# Patient Record
Sex: FEMALE | Race: WHITE | Employment: PART TIME | URBAN - METROPOLITAN AREA
[De-identification: names, ages, dates, MRNs, and addresses within clinical notes are randomized per-mention and may not be internally consistent; named-entity substitution may affect disease eponyms.]

---

## 2017-01-19 ENCOUNTER — HOSPITAL ENCOUNTER (OUTPATIENT)
Dept: PHYSICAL THERAPY | Age: 52
Discharge: HOME | End: 2017-01-19
Payer: COMMERCIAL

## 2017-01-19 PROCEDURE — 97110 THERAPEUTIC EXERCISES: CPT | Performed by: PHYSICAL THERAPIST

## 2017-01-19 PROCEDURE — 97161 PT EVAL LOW COMPLEX 20 MIN: CPT | Performed by: PHYSICAL THERAPIST

## 2017-01-19 NOTE — PROGRESS NOTES
[facility]  [facility], [address] [place] [address]  Phone: [phone]      Fax:  [phone]    Plan of Care/ Statement of Necessity for Physical Therapy Services    Richard Licona  1/19/2017   Vance Helms MD   Provider #                        Diagnosis: Left knee pain [M25.562]  Onset Date:See eval.     Start of Care:1/19/2017  Prior Level of Function:See eval.  Comorbidities: See eval.    The Plan of Care and following information is based on the information from the initial evaluation. Assessment/ key information: Pt is a 45 yo female with PMH of arthroscopic surgery to left knee in the 80's as well as athletic history playing field hockey, basketball and swimming. Pt notes increased medial left knee pain that has been worse since raising puppies/dogs as they are very strong and pull her forward in the past 6 months. Pt with increased pain, decreased balance, decreased ROM increased TTP, increased swelling, decreased functional strength shown in squatting B/L and unilateral, step down and in stairs. Treatment Plan may include any combination of the following:  Therapeutic exercise, Therapeutic activities, Neuromuscular re-education, Physical agent/modality, Gait/balance training, Manual therapy and Patient education  Patient / Family readiness to learn indicated by: asking questions, trying to perform skills and interest  Persons(s) to be included in education:   patient (P)  Barriers to Learning/Limitations: None    Patient Goal (s): Located in evaluation. Rehabilitation Potential: good    Short Term Goals: To be accomplished in 3 weeks  1) Pt able to perform quad set in [facility] without difficulty to improve quad activation so she is able to strengthen quad and recruit while going up and down stairs  2) Pt indep. In HEP from day 1    Long Term Goals: To be accomplished in 4-6 weeks   1) Pt indep.  In final HEP  2) Pt able to descend stairs with good eccentric control on the left that is equal to the right for improved functional quad strength  3) Pt will report she can walk her dogs for 20 ' or more without increased knee pain > 1/10  4) Pt able to perform SLS for 20 sec with EC on the left = to the right (increased stability) for improved balance/proprioception while walking dogs on uneven ground. Frequency / Duration: Patient to be seen 2 times per week for 4-6 weeks:  Patient/ Caregiver education and instruction: dagoberto Carrasco, PT DPT, OCS 1/19/2017 4:02 PM    Please direct questions or concerns to [place] [address] and Sports Medicine: Phone: [phone] Fax: [phone]. THANK YOU!

## 2017-01-19 NOTE — PROGRESS NOTES
[facility]  [address], [place], [address]  Phone: [phone]  Fax: [phone]      PT INITIAL EVALUATION NOTE - [facility] 2-15    Patient Name: Dennie Shackleton  Date:2017  : 1965  [x]  Patient  Verified  Payor: Brant Conley / Plan: Treinta Y Dmitry [id number] PPO / Product Type: PPO /    In time:230  Out time:345  Total Treatment Time (min): 70  Total Timed Codes (min): 20  1:1 Treatment Time ( only): n/a   Visit #: 1     Treatment Area: Left knee pain [M25.562]    SUBJECTIVE  Any medication changes, allergies to medications, adverse drug reactions, diagnosis change, or new procedure performed?: [] No    [x] Yes (see summary sheet for update)    Current symptoms/chief complaint: Pt [facility] [address] partially tore ACL and had arthroscopic surgery x 2. Pt reports overall it was fine after that besides aches/pains with weather. Adopted puppies and now they are 14 mo old, they are 80-90 lbs. Pt reports these dogs are pulling a lot. Pt notes her knee then started to ache more and more from this, changes her gait as they are pulling her forward. Dr. Han Landa gave her a cortisone shot 17, felt relief this Monday - less sharp pain, now more achy. Injection was medial knee. This Monday and Tuesday it felt better but then she swam and walked dogs and felt achy again. Date of onset/injury: past 6 months. Began to have sharp pain 1 month ago. Felt like a knife behind her knee when getting out of the car. Since injection she has less sharp pain and more achy pain. Aggravated by: \"can't straighten knee\"  Overworking it. \"My foot starts to hurt because I am walking funny. . When I am walking the dogs\"  \"Walking, stairs. Cynthemigdio Gomez wake me in the middle of the night. .\"    \"walking on treadmill. \"  Sitting jenna cross applesauce. Eased by: Advil. Injection.      Pt feels better sleeping on right side with pillow. Pain:   8/10 max 1/10 min 4/10 now       Location and description of symptoms: left knee, medial, feels tight under patella. Feels like a sock behind her knee when trying to sit jenna cross applesauce. Foot will hurt medially, near great toe. Diagnostic Tests: [] Lab work [x] X-rays    [] CT [] MRI     [] Other:  Results (per report of the patient): \"bone on bone on the outside\"  \"bursa is inflamed\"     PMHX: Significant for arthritis, asthma, CA - double mastectomy 6 years ago, no chemo. Social/Recreation/Work: Pt does elliptical at the [facility], swims at the [facility] - kicking or elliptical doesn't aggravate knee. 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      Prior level of function: 1 year ago was walking 6 miles for exercise. Patient goal(s): \"better range of motion\"  \"strengthen quad\"  \"I want to be functioning\"  \"I just want to be active\"      Objective:    Posture/Observations: atrophy left quadriceps, VMO, vastus lateralis and atrophy in gastroc-soleus complex  Genu valgum. Left knee is in flexion. Gait:  Pt notes feels \"tight\" while ambulating, slight decreased knee extension at HS/midstance. ROM:  Left : 130 deg flexion medial and posterior pain, pain in anterior tib. Region and thigh. Right:  150 deg. Left:  - 7 deg extension. Right.  - 2 deg. Strength:  Tests 5/5 quad but noted juddering slight on the left after testing. Glute med:  L: 4+/5, R 5/5. Functional Biomechanical Screen  SLS:Holds 20 \" easily with EO. With eyes closed: decreased stability on the left, increased medial/lateral motion at ankle as compared to right but able to hold 10-20 sec. Single Limb Squat:Shows femur ADD/IR/decreased stability and notes pain left posterior/medial knee.     Bilateral Squat:knees anterior to toes and increased weight shift to the right.    Step Down Test:L: shows significant femur ADD/IR and decreased stability and difficulty as compared to right. Bird dog:  With left leg ext / right arm flexion pt with decreased trunk stability in transverse plane as compared to opposite side. Stairs: significant decreased eccentric control left while descending stairs. Palpation:  Tenderness to palpation: no TTP medial TF joint line. TTP along medial/inferior to patella and noted increased effusion left as compared to right. Joint Mobility:  Patellar mobility WFL and no change in pain. Optional Tests  Lachmann's:  [x] Neg    [] Pos  Cisco's:  [x] Neg    [] Pos       Other tests/ comments:    Outcome Measure: Patient presents with a FOTO intake summary score of in chart. OBJECTIVE    20 min Therapeutic Exercise:  [] See flow sheet : see HEP sheet : seated quad sets and WBing standing DLS quad sets (very difficult for pt, needed to focus), hip ABD in EXT on wall, bird dog with focus on core and pelvic stability and activation of gluteals. Rationale: increase strength and improve coordination to improve the patients ability to walk dogs            With   [] TE   [] TA   [] neuro   [] other: Patient Education: [x] Review HEP    [] Progressed/Changed HEP based on:   [] positioning   [] body mechanics   [] transfers   [] heat/ice application    [] other:        Pain Level (0-10 scale) post treatment: pt notes feels achy.        Assessment: See POC    Plan: See [id number] Juliencarrie Rodriguez PT, DPT, OCS    1/19/2017

## 2017-01-24 ENCOUNTER — HOSPITAL ENCOUNTER (OUTPATIENT)
Dept: PHYSICAL THERAPY | Age: 52
Discharge: HOME | End: 2017-01-24
Payer: COMMERCIAL

## 2017-01-24 PROCEDURE — 97112 NEUROMUSCULAR REEDUCATION: CPT | Performed by: PHYSICAL THERAPIST

## 2017-01-24 PROCEDURE — 97110 THERAPEUTIC EXERCISES: CPT | Performed by: PHYSICAL THERAPIST

## 2017-01-24 NOTE — PROGRESS NOTES
PT DAILY TREATMENT NOTE 2-15    Patient Name: Michell Gaston  Date:2017  : 1965  [x]  Patient  Verified  Payor: BLUE CROSS / Plan: Orville Andres [id number] PPO / Product Type: PPO /    In time:430  Out time:515  Total Treatment Time (min): 45  Total Timed Codes (min): 45  1:1 Treatment Time (MC only): n/a   Visit #: 2     Treatment Area: Left knee pain [M25.562]    SUBJECTIVE  Pain Level (0-10 scale): 0/10 pain but feels stiff, been doing the exercises during commercials. . (quad sets)  Any medication changes, allergies to medications, adverse drug reactions, diagnosis change, or new procedure performed?: [x] No    [] Yes (see summary sheet for update)  Subjective functional status/changes:   [] No changes reported  See above    OBJECTIVE        30 min Therapeutic Exercise:  [x] See flow sheet : alyssa   Rationale: increase strength to improve the patients ability to walk her dogs         15 min Neuromuscular Re-education:  [x]  See flow sheet : use of mirror for visual feedback and PT giving verbal and tactile cues for neutral LQ alignment, quad recruitment with use of BOSU weight shift, DLS quad set, stork, 2 inch step down, SLS step type drill. Rationale: improve coordination  to improve the patients ability to walk dogs                With   [] TE   [] TA   [] neuro   [] other: Patient Education: [x] Review HEP    [] Progressed/Changed HEP based on:   [] positioning   [] body mechanics   [] transfers   [] heat/ice application    [] other:      Other Objective/Functional Measures: -     Pain Level (0-10 scale) post treatment: feels quad muscles have been worked, 0/10 pain. ASSESSMENT/Changes in Function:   Pt doing better with quadruped leg ext arm lift with better recruitment of glutes but showing in SLS step drill difficulty with keeping neutral LQ alignment. Pt noting fatigue with stork drill and other progression in HEP today.    Patient will continue to benefit from skilled PT services to modify and progress therapeutic interventions, address strength deficits, analyze and cue movement patterns and instruct in home and community integration to attain remaining goals.      []  See Plan of Care  []  See progress note/recertification  []  See Discharge Summary         Progress towards goals / Updated goals:  NT today    PLAN  [x]  Upgrade activities as tolerated     [x]  Continue plan of care  []  Update interventions per flow sheet       []  Discharge due to:_  [x]  Other:_  Focus on quad strengthening - pt with quad inhibition and atrophy but also shows weakness in glutes and gastroc on left DERIK García, PT DPT, OCS 4/50/5502         PT License #[id number]

## 2017-01-26 ENCOUNTER — APPOINTMENT (OUTPATIENT)
Dept: PHYSICAL THERAPY | Age: 52
End: 2017-01-26
Payer: COMMERCIAL

## 2017-01-31 ENCOUNTER — HOSPITAL ENCOUNTER (OUTPATIENT)
Dept: PHYSICAL THERAPY | Age: 52
Discharge: HOME | End: 2017-01-31
Payer: COMMERCIAL

## 2017-01-31 PROCEDURE — 97110 THERAPEUTIC EXERCISES: CPT | Performed by: PHYSICAL THERAPIST

## 2017-01-31 PROCEDURE — 97112 NEUROMUSCULAR REEDUCATION: CPT | Performed by: PHYSICAL THERAPIST

## 2017-01-31 NOTE — PROGRESS NOTES
PT DAILY TREATMENT NOTE 2-15    Patient Name: Gabbi Parisi  Date:2017  : 1965  [x]  Patient  Verified  Payor: BLUE CROSS / Plan: [facility] PPO / Product Type: PPO /    In time:430  Out time:515  Total Treatment Time (min): 45  Total Timed Codes (min): 45  1:1 Treatment Time (MC only): n/a   Visit #: 3    Treatment Area: Left knee pain [M25.562]    SUBJECTIVE  Pain Level (0-10 scale): 0/10 pain , walked , Monday and Tuesday without pain, I have really been focusing on my gait when I was walking them. Sleep is better, less pain. Any medication changes, allergies to medications, adverse drug reactions, diagnosis change, or new procedure performed?: [x] No    [] Yes (see summary sheet for update)  Subjective functional status/changes:   [] No changes reported  See above    OBJECTIVE      35 min Therapeutic Exercise:  [x] See flow sheet : added lateral stepping with green t-band, SLS ball reach anterior with mini squat for quad recruitment, red t-band for clams. Rationale: increase strength to improve the patients ability to walk her dogs         10 min Neuromuscular Re-education:  [x]  See flow sheet : use of mirror for visual feedback and PT giving verbal and tactile cues for neutral LQ alignment, quad recruitment with use of BOSU weight shift, DLS quad set, stork, 2 inch step down, SLS step type drill. Rationale: improve coordination  to improve the patients ability to walk dogs                With   [] TE   [] TA   [] neuro   [] other: Patient Education: [x] Review HEP    [] Progressed/Changed HEP based on:   [] positioning   [] body mechanics   [] transfers   [] heat/ice application    [] other:      Other Objective/Functional Measures: -     Pain Level (0-10 scale) post treatment: 0    ASSESSMENT/Changes in Function:   Pt with significant improvement, able to walk her dogs x 3 since last visit without knee pain and notes her sleep has improved as well.    Patient will continue to benefit from skilled PT services to modify and progress therapeutic interventions, address strength deficits, analyze and cue movement patterns and instruct in home and community integration to attain remaining goals. []  See Plan of Care  []  See progress note/recertification  []  See Discharge Summary         Short Term Goals: To be accomplished in 3 weeks  1) Pt able to perform quad set in [facility] without difficulty to improve quad activation so she is able to strengthen quad and recruit while going up and down stairs MET  2) Pt indep. In HEP from day 1 MET      Long Term Goals: To be accomplished in 4-6 weeks   1) Pt indep. In final HEP  2) Pt able to descend stairs with good eccentric control on the left that is equal to the right for improved functional quad strength  3) Pt will report she can walk her dogs for 20 ' or more without increased knee pain > 1/10  4) Pt able to perform SLS for 20 sec with EC on the left = to the right (increased stability) for improved balance/proprioception while walking dogs on uneven ground.       PLAN  [x]  Upgrade activities as tolerated     [x]  Continue plan of care  []  Update interventions per flow sheet       []  Discharge due to:_  [x]  Other:_  Focus on quad strengthening - pt with quad inhibition and atrophy but also shows weakness in glutes and gastroc on left DERIK Menchaca Ip, PT DPT, OCS 3/42/3631         PT License #[id number]

## 2017-02-02 ENCOUNTER — HOSPITAL ENCOUNTER (OUTPATIENT)
Dept: PHYSICAL THERAPY | Age: 52
Discharge: HOME | End: 2017-02-02
Payer: COMMERCIAL

## 2017-02-02 PROCEDURE — 97110 THERAPEUTIC EXERCISES: CPT | Performed by: PHYSICAL THERAPIST

## 2017-02-02 PROCEDURE — 97112 NEUROMUSCULAR REEDUCATION: CPT | Performed by: PHYSICAL THERAPIST

## 2017-02-02 NOTE — PROGRESS NOTES
PT DAILY TREATMENT NOTE 2-15    Patient Name: Jacob Garrett  Date:2017  : 1965  [x]  Patient  Verified  Payor: BLUE CROSS / Plan: [employer]ville Andres 5747 PPO / Product Type: PPO /    In time:200  Out time:250  Total Treatment Time (min): 50  Total Timed Codes (min): 50  1:1 Treatment Time (MC only): n/a   Visit #: 4    Treatment Area: Left knee pain [M25.562]    SUBJECTIVE  Pain Level (0-10 scale): 0/10 pain ,   Pt reports walked the dogs yesterday, no knee pain, she is still focusing on not allowing them to pull her forward too much, using her arms/biceps to pull them back. Pt reports the other day she had difficulty getting comfortable but thinks maybe it was more fatigue. . She is sleeping better through the night now as well. Any medication changes, allergies to medications, adverse drug reactions, diagnosis change, or new procedure performed?: [x] No    [] Yes (see summary sheet for update)  Subjective functional status/changes:   [] No changes reported  See above    OBJECTIVE      40 min Therapeutic Exercise:  [x] See flow sheet : Added unilateral squats on total gym   Rationale: increase strength to improve the patients ability to walk her dogs         10 min Neuromuscular Re-education:  [x]  See flow sheet : use of mirror for visual feedback and PT giving verbal and tactile cues for neutral LQ alignment, quad recruitment with use of BOSU weight shift, DLS quad set.   Also Added SLS with EC and SLS with EO on AIREX foam.     Rationale: improve coordination  to improve the patients ability to walk dogs                With   [] TE   [] TA   [] neuro   [] other: Patient Education: [x] Review HEP    [] Progressed/Changed HEP based on:   [] positioning   [] body mechanics   [] transfers   [] heat/ice application    [] other:      Other Objective/Functional Measures: -     Pain Level (0-10 scale) post treatment: 0    ASSESSMENT/Changes in Function:   Pt continues to show improvement, less pain while walking her 2 dogs and decreased knee discomfort at night. We added single limb squat on total gym and noted muscle fasciculations / trembling with unilateral squat and pt noting quad fatigue throughout session. Patient will continue to benefit from skilled PT services to modify and progress therapeutic interventions, address strength deficits, analyze and cue movement patterns and instruct in home and community integration to attain remaining goals. []  See Plan of Care  []  See progress note/recertification  []  See Discharge Summary         Short Term Goals: To be accomplished in 3 weeks  1) Pt able to perform quad set in [facility] without difficulty to improve quad activation so she is able to strengthen quad and recruit while going up and down stairs MET  2) Pt indep. In HEP from day 1 MET      Long Term Goals: To be accomplished in 4-6 weeks   1) Pt indep. In final HEP  2) Pt able to descend stairs with good eccentric control on the left that is equal to the right for improved functional quad strength  3) Pt will report she can walk her dogs for 20 ' or more without increased knee pain > 1/10  4) Pt able to perform SLS for 20 sec with EC on the left = to the right (increased stability) for improved balance/proprioception while walking dogs on uneven ground.       PLAN  [x]  Upgrade activities as tolerated     [x]  Continue plan of care  []  Update interventions per flow sheet       []  Discharge due to:_  [x]  Other:_  Focus on quad strengthening - pt with quad inhibition and atrophy but also shows weakness in glutes and gastroc on left DERIK Schuster, PT DPT, OCS 6/1/0635         PT License #[id number]

## 2017-02-07 ENCOUNTER — HOSPITAL ENCOUNTER (OUTPATIENT)
Dept: PHYSICAL THERAPY | Age: 52
Discharge: HOME | End: 2017-02-07
Payer: COMMERCIAL

## 2017-02-07 PROCEDURE — 97110 THERAPEUTIC EXERCISES: CPT | Performed by: PHYSICAL THERAPIST

## 2017-02-07 PROCEDURE — 97112 NEUROMUSCULAR REEDUCATION: CPT | Performed by: PHYSICAL THERAPIST

## 2017-02-07 NOTE — PROGRESS NOTES
PT DAILY TREATMENT NOTE 2-15    Patient Name: Marshall Brooks  Date:2017  : 1965  [x]  Patient  Verified  Payor: BLUE CROSS / Plan: [facility] PPO / Product Type: PPO /    In time:300  Out time:400  Total Treatment Time (min): 60   Total Timed Codes (min): 60  1:1 Treatment Time ( only): n/a   Visit #: 5    Treatment Area: Left knee pain [M25.562]    SUBJECTIVE  Pain Level (0-10 scale):Pt reports 0/10 pain currently but notes she was sore a bit after last visit and next day. Bernard Hermosillo her dogs earlier and she didn't really have any pain but did use the harness. .the harness does help a lot. .  She did have a little trouble sleeping the other night. .    Any medication changes, allergies to medications, adverse drug reactions, diagnosis change, or new procedure performed?: [x] No    [] Yes (see summary sheet for update)  Subjective functional status/changes:   [] No changes reported  See above    OBJECTIVE    Palpation: pain medial and slightly inferior to left patella. Pain with supine SLR today, with manual glide superior pain abolished. 50 min Therapeutic Exercise:  [x] See flow sheet :Added swiss ball for S/L hip ABD/ext. Focused on quad recruitment, hip strengthening. Rationale: increase strength to improve the patients ability to walk her dogs         10 min Neuromuscular Re-education:  [x]  See flow sheet : use of mirror for visual feedback and PT giving verbal and tactile cues for neutral LQ alignment, quad recruitment with use of BOSU weight shift, DLS quad set, SLS with EC and SLS with EO on AIREX foam.      Use of hypofix and leukotape for superior glide of patella.     Rationale: improve coordination  to improve the patients ability to walk dogs              With   [] TE   [] TA   [] neuro   [] other: Patient Education: [x] Review HEP    [] Progressed/Changed HEP based on:   [] positioning   [] body mechanics   [] transfers   [] heat/ice application    [] other: Other Objective/Functional Measures: -   See above    Pain Level (0-10 scale) post treatment: 0    ASSESSMENT/Changes in Function:   Pt noting had some soreness for 1-2 days after last session and had some increase in pain at night. We did add several new therapeutic exercises last visit so today we focused on quality of LQ alignment and correct muscle recruitment. Pt with increased knee pain with SLR with 2lb weight and even with 2 lb weight removed continued with pain. Pt with relief with manual superior patellar glide so taping was performed and pt noted relief of pain. Patient will continue to benefit from skilled PT services to modify and progress therapeutic interventions, address strength deficits, analyze and cue movement patterns and instruct in home and community integration to attain remaining goals. []  See Plan of Care  []  See progress note/recertification  []  See Discharge Summary         Short Term Goals: To be accomplished in 3 weeks  1) Pt able to perform quad set in [facility] without difficulty to improve quad activation so she is able to strengthen quad and recruit while going up and down stairs MET  2) Pt indep. In HEP from day 1 MET      Long Term Goals: To be accomplished in 4-6 weeks   1) Pt indep. In final HEP  2) Pt able to descend stairs with good eccentric control on the left that is equal to the right for improved functional quad strength  3) Pt will report she can walk her dogs for 20 ' or more without increased knee pain > 1/10  4) Pt able to perform SLS for 20 sec with EC on the left = to the right (increased stability) for improved balance/proprioception while walking dogs on uneven ground.       PLAN  [x]  Upgrade activities as tolerated     [x]  Continue plan of care  []  Update interventions per flow sheet       []  Discharge due to:_  [x]  Other:_  Focus on quad strengthening - pt with quad inhibition and atrophy but also shows weakness in glutes and gastroc on left LE.  Repeat taping if needed.       Camilla De Jesus, PT DPT, OCS 7/9/3411         PT License #[id number]

## 2017-02-09 ENCOUNTER — HOSPITAL ENCOUNTER (OUTPATIENT)
Dept: PHYSICAL THERAPY | Age: 52
Discharge: HOME | End: 2017-02-09
Payer: COMMERCIAL

## 2017-02-09 PROCEDURE — 97112 NEUROMUSCULAR REEDUCATION: CPT | Performed by: PHYSICAL THERAPIST

## 2017-02-09 PROCEDURE — 97110 THERAPEUTIC EXERCISES: CPT | Performed by: PHYSICAL THERAPIST

## 2017-02-09 NOTE — PROGRESS NOTES
PT DAILY TREATMENT NOTE 2-15    Patient Name: Shawn Lane  Date:2017  : 1965  [x]  Patient  Verified  Payor: BLUE CROSS / Plan: [facility] PPO / Product Type: PPO /    In time:300  Out time:355  Total Treatment Time (min): 55   Total Timed Codes (min): 55  1:1 Treatment Time (MC only): n/a   Visit #: 6    Treatment Area: Left knee pain [M25.562]    SUBJECTIVE  Pain Level (0-10 scale):Pt reports 0/10 pain but still does have twinges. . At night or when trying to get comfortable in the evening, straightening her knee is uncomfortable, puts pillow under her knee but then that gets uncomfortable too. Mavis Mckeon However, she did just walk her dogs and walked far and her knee felt really good. Overall, pt reports she is feeling better since starting PT, she has been trying to be more aware of how she is walking and using her quad. Any medication changes, allergies to medications, adverse drug reactions, diagnosis change, or new procedure performed?: [x] No    [] Yes (see summary sheet for update)  Subjective functional status/changes:   [] No changes reported  See above    OBJECTIVE    Stairs: pt able to descend stairs with good eccentric control without any pain on L LE    Balance:  SLS EC : 30 sec left LE    45 min Therapeutic Exercise:  [x] See flow sheet :  Focused on quad recruitment, hip strengthening. Rationale: increase strength to improve the patients ability to walk her dogs         10 min Neuromuscular Re-education:  [x]  See flow sheet : use of mirror for visual feedback and PT giving verbal and tactile cues for neutral LQ alignment, quad recruitment with use of BOSU weight shift, DLS quad set, SLS with EC and SLS with EO on AIREX foam.      Added SLS on foam with throw/catch at rebounder for perturbations for balance, coordination.     Rationale: improve coordination  to improve the patients ability to walk dogs              With   [] TE   [] TA   [] neuro   [] other: Patient Education: [x] Review HEP    [] Progressed/Changed HEP based on:   [] positioning   [] body mechanics   [] transfers   [] heat/ice application    [] other:      Other Objective/Functional Measures: -   See above    Pain Level (0-10 scale) post treatment: 0    ASSESSMENT/Changes in Function:   Pt reports still experiencing twinges of pain at time but overall showing great progress since first visit. Pt shows improved eccentric control on the L LE while descending stairs and improved SLS balance with eyes closed. Patient will continue to benefit from skilled PT services to modify and progress therapeutic interventions, address strength deficits, analyze and cue movement patterns and instruct in home and community integration to attain remaining goals. []  See Plan of Care  []  See progress note/recertification  []  See Discharge Summary         Short Term Goals: To be accomplished in 3 weeks  1) Pt able to perform quad set in [facility] without difficulty to improve quad activation so she is able to strengthen quad and recruit while going up and down stairs MET  2) Pt indep. In HEP from day 1 MET      Long Term Goals: To be accomplished in 4-6 weeks   1) Pt indep. In final HEP  2) Pt able to descend stairs with good eccentric control on the left that is equal to the right for improved functional quad strength MET   3) Pt will report she can walk her dogs for 20 ' or more without increased knee pain > 1/10  4) Pt able to perform SLS for 20 sec with EC on the left = to the right (increased stability) for improved balance/proprioception while walking dogs on uneven ground MET     PLAN  [x]  Upgrade activities as tolerated     [x]  Continue plan of care  []  Update interventions per flow sheet       []  Discharge due to:_  [x]  Other:_  Focus on quad strengthening - pt with quad inhibition and atrophy but also shows weakness in glutes and gastroc on left LE.  2 visits next week, 1 visit following week.      Steve De Souza Rosio Chester, PT DPT, OCS 5/0/9086         PT License #[id number]

## 2017-02-14 ENCOUNTER — HOSPITAL ENCOUNTER (OUTPATIENT)
Dept: PHYSICAL THERAPY | Age: 52
Discharge: HOME | End: 2017-02-14
Payer: COMMERCIAL

## 2017-02-14 PROCEDURE — 97110 THERAPEUTIC EXERCISES: CPT | Performed by: PHYSICAL THERAPIST

## 2017-02-14 PROCEDURE — 97140 MANUAL THERAPY 1/> REGIONS: CPT | Performed by: PHYSICAL THERAPIST

## 2017-02-14 NOTE — PROGRESS NOTES
PT DAILY TREATMENT NOTE 2-15    Patient Name: Lisa Dee  Date:2017  : 1965  [x]  Patient  Verified  Payor: Haritha Gonzáles / Plan: [id number] PPO / Product Type: PPO /    In time:300  Out time:355  Total Treatment Time (min): 55   Total Timed Codes (min): 55  1:1 Treatment Time ( only): n/a   Visit #: 7    Treatment Area: Left knee pain [M25.562]    SUBJECTIVE  Pain Level (0-10 scale):Pt reports 0/10 pain, sometimes her knee gets twinges but went for a walk with the dogs and did well with that. . Sometimes the twinges occur when she is driving and she is always needing to move her leg around. .   Any medication changes, allergies to medications, adverse drug reactions, diagnosis change, or new procedure performed?: [x] No    [] Yes (see summary sheet for update)  Subjective functional status/changes:   [] No changes reported  See above    OBJECTIVE      45 min Therapeutic Exercise:  [x] See flow sheet :  Focused on quad recruitment, hip strengthening. Added bridges on swiss ball. Discussed posture while driving - neutral LQ alignment, avoid tibial ER   Rationale: increase strength to improve the patients ability to walk her dogs         10 min Neuromuscular Re-education:  [x]  See flow sheet : use of mirror for visual feedback and PT giving verbal and tactile cues for neutral LQ alignment, quad recruitment with use of BOSU weight shift, DLS quad set, SLS with EC and SLS with EO on AIREX foam.      Continued with SLS on foam with throw/catch at rebounder for perturbations for balance, coordination.     Rationale: improve coordination  to improve the patients ability to walk dogs              With   [] TE   [] TA   [] neuro   [] other: Patient Education: [x] Review HEP    [] Progressed/Changed HEP based on:   [] positioning   [] body mechanics   [] transfers   [] heat/ice application    [] other:      Other Objective/Functional Measures: -   See above    Pain Level (0-10 scale) post treatment: 0    ASSESSMENT/Changes in Function:   Pt did well with progression in sets as well as addition of bridges on swiss ball for core and gluteal strengthening. Patient will continue to benefit from skilled PT services to modify and progress therapeutic interventions, address strength deficits, analyze and cue movement patterns and instruct in home and community integration to attain remaining goals. []  See Plan of Care  []  See progress note/recertification  []  See Discharge Summary         Short Term Goals: To be accomplished in 3 weeks  1) Pt able to perform quad set in [facility] without difficulty to improve quad activation so she is able to strengthen quad and recruit while going up and down stairs MET  2) Pt indep. In HEP from day 1 MET      Long Term Goals: To be accomplished in 4-6 weeks   1) Pt indep. In final HEP  2) Pt able to descend stairs with good eccentric control on the left that is equal to the right for improved functional quad strength MET   3) Pt will report she can walk her dogs for 20 ' or more without increased knee pain > 1/10  4) Pt able to perform SLS for 20 sec with EC on the left = to the right (increased stability) for improved balance/proprioception while walking dogs on uneven ground MET     PLAN  [x]  Upgrade activities as tolerated     [x]  Continue plan of care  []  Update interventions per flow sheet       []  Discharge due to:_  [x]  Other:_  Focus on quad strengthening - pt with quad inhibition and atrophy but also shows weakness in glutes and gastroc on left LE. Visit with VERONICA SHIN Thursday, last visit next week and will re-assess.      Sanjuanita Chinchilla, PT DPT, OCS 8/96/3775         PT License #[id number]

## 2017-02-16 ENCOUNTER — HOSPITAL ENCOUNTER (OUTPATIENT)
Dept: PHYSICAL THERAPY | Age: 52
Discharge: HOME | End: 2017-02-16
Payer: COMMERCIAL

## 2017-02-16 PROCEDURE — 97112 NEUROMUSCULAR REEDUCATION: CPT | Performed by: PHYSICAL THERAPY ASSISTANT

## 2017-02-16 PROCEDURE — 97110 THERAPEUTIC EXERCISES: CPT | Performed by: PHYSICAL THERAPY ASSISTANT

## 2017-02-16 NOTE — PROGRESS NOTES
PT DAILY TREATMENT NOTE 2-15    Patient Name: Dianne Wick  Date:2017  : 1965  [x]  Patient  Verified  Payor: BLUE CROSS / Plan: [id number] PPO / Product Type: PPO /    In time:1:30 PM  Out time:2:25 PM  Total Treatment Time (min): 55   Total Timed Codes (min): 55  1:1 Treatment Time (MC only): n/a   Visit #: 8    Treatment Area: Left knee pain [M25.562]    SUBJECTIVE  Pain Level (0-10 scale):\"I'm feeling great, was a little sore after the progression of exercises last visit but ready to take it to the [facility]. \"  Any medication changes, allergies to medications, adverse drug reactions, diagnosis change, or new procedure performed?: [x] No    [] Yes (see summary sheet for update)  Subjective functional status/changes:   [] No changes reported  See above    OBJECTIVE      45 min Therapeutic Exercise:  [x] See flow sheet :  Focused on quad recruitment, hip strengthening. Added bridges on swiss ball. Discussed posture while driving - neutral LQ alignment, avoid tibial ER   Rationale: increase strength to improve the patients ability to walk her dogs         10 min Neuromuscular Re-education:  [x]  See flow sheet : use of mirror for visual feedback and PT giving verbal and tactile cues for neutral LQ alignment, quad recruitment with use of BOSU weight shift, DLS quad set, SLS with EC and SLS with EO on AIREX foam.      Continued with SLS on foam with throw/catch at rebounder for perturbations for balance, coordination.     Rationale: improve coordination  to improve the patients ability to walk dogs              With   [] TE   [] TA   [] neuro   [] other: Patient Education: [x] Review HEP    [] Progressed/Changed HEP based on:   [] positioning   [] body mechanics   [] transfers   [] heat/ice application    [] other:      Other Objective/Functional Measures: -     Pain Level (0-10 scale) post treatment: 0    ASSESSMENT/Changes in Function:   Fatigue noted with increased reps with s/l hip abduction/ext using SB against wall. Overall tolerated session well, minor cues to avoid knee valgus during eccentric quad strengthening using 2 inch block. Patient will continue to benefit from skilled PT services to modify and progress therapeutic interventions, address strength deficits, analyze and cue movement patterns and instruct in home and community integration to attain remaining goals. []  See Plan of Care  []  See progress note/recertification  []  See Discharge Summary         Short Term Goals: To be accomplished in 3 weeks  1) Pt able to perform quad set in [facility] without difficulty to improve quad activation so she is able to strengthen quad and recruit while going up and down stairs MET  2) Pt indep. In HEP from day 1 MET      Long Term Goals: To be accomplished in 4-6 weeks   1) Pt indep. In final HEP  2) Pt able to descend stairs with good eccentric control on the left that is equal to the right for improved functional quad strength MET   3) Pt will report she can walk her dogs for 20 ' or more without increased knee pain > 1/10  4) Pt able to perform SLS for 20 sec with EC on the left = to the right (increased stability) for improved balance/proprioception while walking dogs on uneven ground MET     PLAN  [x]  Upgrade activities as tolerated     [x]  Continue plan of care  []  Update interventions per flow sheet       []  Discharge due to:_  [x]  Other:_  Focus on quad strengthening - pt with quad inhibition and atrophy but also shows weakness in glutes and gastroc on left LE.  last visit next week and will re-assess.      Latesha Gregg, PTA  2/16/2017         1:30 PM

## 2017-02-21 ENCOUNTER — APPOINTMENT (OUTPATIENT)
Dept: PHYSICAL THERAPY | Age: 52
End: 2017-02-21
Payer: COMMERCIAL

## 2017-02-23 ENCOUNTER — HOSPITAL ENCOUNTER (OUTPATIENT)
Dept: PHYSICAL THERAPY | Age: 52
Discharge: HOME | End: 2017-02-23
Payer: COMMERCIAL

## 2017-02-23 PROCEDURE — 97112 NEUROMUSCULAR REEDUCATION: CPT | Performed by: PHYSICAL THERAPIST

## 2017-02-23 PROCEDURE — 97110 THERAPEUTIC EXERCISES: CPT | Performed by: PHYSICAL THERAPIST

## 2017-02-23 NOTE — PROGRESS NOTES
PT DAILY TREATMENT NOTE 2-15    Patient Name: Salas Apa  Date:2017  : 1965  [x]  Patient  Verified  Payor: BLUE CROSS / Plan: [employer] PPO / Product Type: PPO /    In time:2:30 PM  Out time:330 PM  Total Treatment Time (min): 60   Total Timed Codes (min): 60  1:1 Treatment Time (MC only): n/a   Visit #: 9    Treatment Area: Left knee pain [M25.562]    SUBJECTIVE  Pain Level (0-10 scale): 0/10. Pt notes she is able to walk her dog 3 miles without pain, still has twinges now and then but overall no pain interfering with sleep and she has been able to walk on the treadmill without pain. Any medication changes, allergies to medications, adverse drug reactions, diagnosis change, or new procedure performed?: [x] No    [] Yes (see summary sheet for update)  Subjective functional status/changes:   [] No changes reported  See above    OBJECTIVE    ROM:  - 2 deg extension, 132 deg flexion. Strength: quad with MMT 5/5 without juddering. Balance: SLS EC 30 sec on L LE. Palpation: no TTP medial / interior to patella    Observation:  Quads still notably atrophied left to right. 50 min Therapeutic Exercise:  [x] See flow sheet :  Focused on quad recruitment, hip strengthening. Re-evaluation as noted above. Made list of HEP and encouraged pt to continue at community gym. Rationale: increase strength to improve the patients ability to walk her dogs         10 min Neuromuscular Re-education:  [x]  See flow sheet : use of mirror for visual feedback and PT giving verbal and tactile cues for neutral LQ alignment, quad recruitment with use of BOSU weight shift, SLS with EC and SLS with EO on AIREX foam.      Continued with SLS on foam with throw/catch at rebounder for perturbations for balance, coordination.     Rationale: improve coordination  to improve the patients ability to walk dogs              With   [] TE   [] TA   [] neuro   [] other: Patient Education: [x] Review HEP    [] Progressed/Changed HEP based on:   [] positioning   [] body mechanics   [] transfers   [] heat/ice application    [] other:      Other Objective/Functional Measures: -     Pain Level (0-10 scale) post treatment: 0    ASSESSMENT/Changes in Function:   Pt with 9 skilled PT visits at this time. Pt has met 100% of STG's and LTG's. She has been able to walk her 2 dogs for > 20 minutes, sleep without pain and shows improved eccentric control when descending stairs. Pt is ready for D/C to HEP. Short Term Goals: To be accomplished in 3 weeks  1) Pt able to perform quad set in [facility] without difficulty to improve quad activation so she is able to strengthen quad and recruit while going up and down stairs MET  2) Pt indep. In HEP from day 1 MET      Long Term Goals: To be accomplished in 4-6 weeks   1) Pt indep.  In final HEP MET  2) Pt able to descend stairs with good eccentric control on the left that is equal to the right for improved functional quad strength MET   3) Pt will report she can walk her dogs for 20 ' or more without increased knee pain > 1/10 MET   4) Pt able to perform SLS for 20 sec with EC on the left = to the right MET (increased stability) for improved balance/proprioception while walking dogs on uneven ground MET     PLAN  [x]  Upgrade activities as tolerated     [x]  Continue plan of care  []  Update interventions per flow sheet       [x]  Discharge due to:goals met_      Stu Chau, PT  2/23/2017         1:30 PM

## 2017-02-23 NOTE — ANCILLARY DISCHARGE INSTRUCTIONS
PT Discharge NOTE 2-15    Patient Name: Cassidy Hart  Date:2017  : 1965    Visit #: 9    Treatment Area: Left knee pain [M25.562]    SUBJECTIVE  Pain Level (0-10 scale): 0/10. Pt notes she is able to walk her dog 3 miles without pain, still has twinges now and then but overall no pain interfering with sleep and she has been able to walk on the treadmill without pain. OBJECTIVE    ROM:  - 2 deg extension, 132 deg flexion. Strength: quad with MMT 5/5 without juddering. Balance: SLS EC 30 sec on L LE. Palpation: no TTP medial / interior to patella    Observation:  Quads still notably atrophied left to right. ASSESSMENT/Changes in Function:   Pt with 9 skilled PT visits at this time from  through . Pt has met 100% of STG's and LTG's. She has been able to walk her 2 dogs for > 20 minutes, sleep without pain and shows improved eccentric control when descending stairs. Pt is ready for D/C to HEP. Short Term Goals: To be accomplished in 3 weeks  1) Pt able to perform quad set in [facility] without difficulty to improve quad activation so she is able to strengthen quad and recruit while going up and down stairs MET  2) Pt indep. In HEP from day 1 MET      Long Term Goals: To be accomplished in 4-6 weeks   1) Pt indep.  In final HEP MET  2) Pt able to descend stairs with good eccentric control on the left that is equal to the right for improved functional quad strength MET   3) Pt will report she can walk her dogs for 20 ' or more without increased knee pain > 1/10 MET   4) Pt able to perform SLS for 20 sec with EC on the left = to the right MET (increased stability) for improved balance/proprioception while walking dogs on uneven ground MET     PLAN  [x]  Upgrade activities as tolerated     [x]  Continue plan of care  []  Update interventions per flow sheet       [x]  Discharge due to:goals met_      Angela Brantley, PT  2017         1:30 PM

## 2017-02-24 ENCOUNTER — APPOINTMENT (OUTPATIENT)
Dept: PHYSICAL THERAPY | Age: 52
End: 2017-02-24
Payer: COMMERCIAL

## 2019-02-06 ENCOUNTER — HOSPITAL ENCOUNTER (OUTPATIENT)
Dept: CT IMAGING | Age: 54
Discharge: HOME | End: 2019-02-06
Payer: SELF-PAY

## 2019-02-06 DIAGNOSIS — Z00.00 PREVENTATIVE HEALTH CARE: ICD-10-CM

## 2019-02-06 PROCEDURE — 75571 CT HRT W/O DYE W/CA TEST: CPT

## 2019-04-22 ENCOUNTER — OFFICE VISIT (OUTPATIENT)
Dept: CARDIOLOGY CLINIC | Age: 54
End: 2019-04-22

## 2019-04-22 VITALS
HEART RATE: 64 BPM | WEIGHT: 148.2 LBS | DIASTOLIC BLOOD PRESSURE: 90 MMHG | RESPIRATION RATE: 16 BRPM | HEIGHT: 66 IN | SYSTOLIC BLOOD PRESSURE: 130 MMHG | BODY MASS INDEX: 23.82 KG/M2

## 2019-04-22 DIAGNOSIS — E78.00 ELEVATED LDL CHOLESTEROL LEVEL: ICD-10-CM

## 2019-04-22 DIAGNOSIS — R07.9 CHEST PAIN, UNSPECIFIED TYPE: ICD-10-CM

## 2019-04-22 DIAGNOSIS — E78.5 DYSLIPIDEMIA: ICD-10-CM

## 2019-04-22 DIAGNOSIS — I25.10 CORONARY ARTERY DISEASE INVOLVING NATIVE CORONARY ARTERY OF NATIVE HEART WITHOUT ANGINA PECTORIS: ICD-10-CM

## 2019-04-22 DIAGNOSIS — I25.10 CORONARY ARTERY DISEASE INVOLVING NATIVE HEART WITHOUT ANGINA PECTORIS, UNSPECIFIED VESSEL OR LESION TYPE: Primary | ICD-10-CM

## 2019-04-22 RX ORDER — LEVOTHYROXINE SODIUM 50 UG/1
TABLET ORAL
COMMUNITY

## 2019-04-22 RX ORDER — LORAZEPAM 0.5 MG/1
TABLET ORAL
COMMUNITY

## 2019-04-22 RX ORDER — ROSUVASTATIN CALCIUM 20 MG/1
20 TABLET, COATED ORAL
Qty: 90 TAB | Refills: 3 | Status: SHIPPED | OUTPATIENT
Start: 2019-04-22 | End: 2019-10-21 | Stop reason: SDUPTHER

## 2019-04-22 RX ORDER — GUAIFENESIN 100 MG/5ML
81 LIQUID (ML) ORAL DAILY
Qty: 90 TAB | Refills: 3 | Status: SHIPPED | OUTPATIENT
Start: 2019-04-22 | End: 2020-07-15

## 2019-04-22 RX ORDER — ACETAMINOPHEN 500 MG
TABLET ORAL
COMMUNITY

## 2019-04-22 RX ORDER — LANOLIN ALCOHOL/MO/W.PET/CERES
CREAM (GRAM) TOPICAL
COMMUNITY
Start: 2018-01-23 | End: 2019-10-21

## 2019-04-22 RX ORDER — METFORMIN HYDROCHLORIDE 500 MG/1
TABLET ORAL
COMMUNITY

## 2019-04-22 RX ORDER — SERTRALINE HYDROCHLORIDE 100 MG/1
TABLET, FILM COATED ORAL
COMMUNITY
Start: 2018-03-20

## 2019-04-22 NOTE — PROGRESS NOTES
[place]: [place]  [phone]  Requesting/referring provider: Dr Norah Gonzalez  Reason for Consult: CAD. HPI: Anil Amos, a 52y.o. year-old who presents for evaluation of early CAD. Breast cancer with double mastectomy and tamoxifen x 5 years then no Chemo or XRT, mother with MI at 61. Mother with HTN unknown cholesterol. BP at home 119/81. Generally feeling well but now that she has this calcium score she has had chest discomfort mostly with anxiety, etc. Also may be having menopause sx and started zoloft and lorazepam. She feels like she  Has a lot of anxiety, walks 2-3 miles a day with her dogs. Active and runs some with the dogs, no exertional symptoms. Swims in the summer. Her mother with more sedentary and smoked.  has lost 75 pound in the last year with diet, and they are eating very healthy and trying to maintain a heathy lifestyle. She sees Dr. Efrain Wright 8 weeks. Assessment/Plan:  1. Vit D deficiency- cont repletion  2. FH   HDL 82- discussed FH and need for therapy, testing, diet, follow-up.   -crestor 20mg daily  3. CAD- stress echo to look for CAD, though young healthy now has sx with chest discomfort, new cad diagnosis  4. Anxiety- high, given events, no treatment for now, lifestyle interventions, exercise, yoga, etc.   5. IGt on metformin  6. Hypothyroid- on repletion. Soc teaches 4yo at [employer]  No tob rare etoh  Fhx + early CAD  She  has no past medical history on file. Cardiovascular ROS: positive for - chest pain  Respiratory ROS: no cough, shortness of breath, or wheezing  Neurological ROS: no TIA or stroke symptoms  All other systems negative except as above. PE  Vitals:    04/22/19 1532   BP: 130/90   Pulse: 64   Resp: 16   Weight: 148 lb 3.2 oz (67.2 kg)   Height: 5' 6\" (1.676 m)    Body mass index is 23.92 kg/m².    General appearance - alert, well appearing, and in no distress  Mental status - affect appropriate to mood  Eyes - sclera anicteric, moist mucous membranes  Neck - supple, no significant adenopathy  Lymphatics - no  lymphadenopathy  Chest - clear to auscultation, no wheezes, rales or rhonchi  Heart - normal rate, regular rhythm, normal S1, S2, no murmurs, rubs, clicks or gallops  Abdomen - soft, nontender, nondistended, no masses or organomegaly  Back exam - full range of motion, no tenderness  Neurological - cranial nerves II through XII grossly intact, no focal deficit  Musculoskeletal - no muscular tenderness noted, normal strength  Extremities - peripheral pulses normal, no pedal edema  Skin - normal coloration  no rashes    Recent Labs:  No results found for: CHOL, CHOLX, CHLST, CHOLV, 687686, HDL, LDL, LDLC, DLDLP, TGLX, TRIGL, TRIGP, CHHD, CHHDX  No results found for: CARLO, CREAPOC, ACREA, CREA, REFC3, REFC4  No results found for: BUN, BUNPOC, IBUN, MBUNV, BUNV  No results found for: K, KI, PLK, WBK  No results found for: HBA1C, HGBE8, OGC5NLVR, GAK0HTJO  No results found for: HGBPOC, HGB, HGBP, HGBEXT, HGBEXT  No results found for: PLT, PLTEXT, PLTEXT    Reviewed:  No past medical history on file.   Social History     Tobacco Use   Smoking Status Never Smoker   Smokeless Tobacco Never Used     Social History     Substance and Sexual Activity   Alcohol Use Yes    Frequency: 2-4 times a month    Drinks per session: 1 or 2     Allergies   Allergen Reactions    Shellfish Derived Itching       Current Outpatient Medications   Medication Sig    metFORMIN (GLUCOPHAGE) 500 mg tablet metformin   taking 2 per day    cyanocobalamin (VITAMIN B-12) 1,000 mcg tablet Vitamin B-12   1,000 mcg B 12    sertraline (ZOLOFT) 100 mg tablet sertraline 100 mg tablet   TAKE 1 TABLET BY MOUTH ONCE DAILY    ferrous sulfate (SLOW FE PO) Slow Fe    ascorbate calcium (VITAMIN C PO) Vitamin C    ibuprofen (ADVIL PO) Advil    levothyroxine (SYNTHROID) 50 mcg tablet levothyroxine   50 mcg 1 tablet once a day    OMEGA-3 FATTY ACIDS PO Omega 3    LORazepam (ATIVAN) 0.5 mg tablet lorazepam 0.5 mg tablet   one orally every night at bedtime    cholecalciferol (VITAMIN D3) 2,000 unit cap capsule Vitamin D3 2,000 unit capsule   Take by oral route.  rosuvastatin (CRESTOR) 20 mg tablet Take 1 Tab by mouth nightly.  aspirin 81 mg chewable tablet Take 1 Tab by mouth daily. No current facility-administered medications for this visit.         Alfreda Gonsalves MD  [facility] [place]  [address]

## 2019-05-13 ENCOUNTER — TELEPHONE (OUTPATIENT)
Dept: CARDIOLOGY CLINIC | Age: 54
End: 2019-05-13

## 2019-05-13 NOTE — TELEPHONE ENCOUNTER
----- Message from Jania Anderson MD sent at 5/10/2019  4:55 PM EDT -----  Meribeth Hatchet! Normal stress test!      Above stress echo results given to patient.  2 pt identifiers used

## 2019-10-21 ENCOUNTER — OFFICE VISIT (OUTPATIENT)
Dept: CARDIOLOGY CLINIC | Age: 54
End: 2019-10-21

## 2019-10-21 VITALS
DIASTOLIC BLOOD PRESSURE: 80 MMHG | HEART RATE: 74 BPM | SYSTOLIC BLOOD PRESSURE: 120 MMHG | BODY MASS INDEX: 24.75 KG/M2 | OXYGEN SATURATION: 98 % | HEIGHT: 66 IN | RESPIRATION RATE: 16 BRPM | WEIGHT: 154 LBS

## 2019-10-21 DIAGNOSIS — E78.01 FAMILIAL HYPERCHOLESTEROLEMIA: ICD-10-CM

## 2019-10-21 DIAGNOSIS — I25.10 CORONARY ARTERY DISEASE INVOLVING NATIVE CORONARY ARTERY OF NATIVE HEART WITHOUT ANGINA PECTORIS: Primary | ICD-10-CM

## 2019-10-21 RX ORDER — ROSUVASTATIN CALCIUM 40 MG/1
40 TABLET, COATED ORAL DAILY
Qty: 90 TAB | Refills: 3 | Status: SHIPPED | OUTPATIENT
Start: 2019-10-21 | End: 2020-11-10

## 2019-10-21 NOTE — PATIENT INSTRUCTIONS
Please increase your rosuvastatin to 40mg daily and have your cholesterol rechecked in 3 months  We would like your LDL to be less than 70  Keep up the good work on your exercise

## 2019-10-21 NOTE — PROGRESS NOTES
[facility]: Geovany De Leon  [phone]    HPI: Bandar Marlow, a 62y.o. year-old who presents for follow up regarding her CAD. She is feeling great, says she started to feel good after starting statin  Walks 1.5 - 2 miles/day  Goes to [place], goes to the [facility]  Denies any chest pain or palpitations  No dyspnea with exertion  No dizziness or syncope  No LE edema   Anxiety has improved    has lost 75 pound in the last year with diet  They are eating very healthy and trying to maintain a heathy lifestyle  She sees Dr. Magali Pabon regularly for weight loss. She will continue her healthy lifestyle and let me know if anything changes from there. Assessment/Plan:  1. Vit D deficiency- on 2000 units daily, level 49  2. Familial hypercholesterolemia - Hx of   HDL 82  -recent lipids 10/19 - , TG 98, , HDL 84, just increased her rosuvastatin to 30mg daily, discussed LDL goal < 70 and advised her to increase her rosuvastatin dose to 40mg daily(just for ease of taking it) and have lipids rechecked in 3 months   3. CAD- coronary calcium scan - 148 in 2/19, continue ASA and statin  -no ischemia on stress echo in 5/19  -encouraged diet and exercise  -follow up here again in 1 year   4. Anxiety- improved on zoloft, takes ativan PRN, lifestyle interventions, exercise, yoga, etc.    5. IGT - A1C 5.6%, on metformin  6. Hypothyroid- on synthroid, TSH 3.1   7. 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 She also has no past medical history of Essential hypertension.     Cardiovascular ROS: no palpitations or chest pain  Respiratory ROS: no cough, shortness of breath, or wheezing  Neurological ROS: no TIA or stroke symptoms  All other systems negative except as above. PE  Vitals:    10/21/19 1431   BP: 120/80   Pulse: 74   Resp: 16   SpO2: 98%   Weight: 154 lb (69.9 kg)   Height: 5' 6\" (1.676 m)    Body mass index is 24.86 kg/m².   General appearance - alert, well appearing, and in no distress  Mental status - affect appropriate to mood  Eyes - sclera anicteric, moist mucous membranes  Neck - supple, no carotid bruits   Lymphatics - not assessed   Chest - clear to auscultation, no wheezes, rales or rhonchi  Heart - normal rate, regular rhythm, normal S1, S2, no murmurs, rubs, clicks or gallops  Abdomen - soft, nontender, nondistended  Back exam - full range of motion  Neurological - cranial nerves II through XII grossly intact, no focal deficit  Musculoskeletal - normal strength  Extremities - peripheral pulses normal, no pedal edema  Skin - normal coloration  no rashes    Recent Labs:  No results found for: CHOL, CHOLX, CHLST, CHOLV, 458653, HDL, HDLP, LDL, LDLC, DLDLP, TGLX, TRIGL, TRIGP, CHHD, CHHDX  No results found for: CARLO, CREAPOC, ACREA, CREA, REFC3, REFC4  No results found for: BUN, BUNPOC, IBUN, MBUNV, BUNV  No results found for: K, KI, PLK, WBK  No results found for: HBA1C, HGBE8, CEW1DSOP, RCT5ZMYI  No results found for: HGBPOC, HGB, HGBP, HGBEXT, HGBEXT  No results found for: PLT, PLTEXT, PLTEXT    Reviewed:  Past Medical History:   Diagnosis Date    CAD (coronary artery disease)     Hyperlipidemia     Thyroid disease      Social History     Tobacco Use   Smoking Status Never Smoker   Smokeless Tobacco Never Used     Social History     Substance and Sexual Activity   Alcohol Use Yes    Frequency: 2-4 times a month    Drinks per session: 1 or 2     Allergies   Allergen Reactions    Shellfish Derived Itching       Current Outpatient Medications   Medication Sig    rosuvastatin (CRESTOR) 40 mg tablet Take 1 Tab by mouth daily.  metFORMIN (GLUCOPHAGE) 500 mg tablet metformin   taking 2 per day    sertraline (ZOLOFT) 100 mg tablet sertraline 100 mg tablet   TAKE 1 TABLET BY MOUTH ONCE DAILY    ferrous sulfate (SLOW FE PO) Slow Fe    ascorbate calcium (VITAMIN C PO) Vitamin C    ibuprofen (ADVIL PO) Advil    levothyroxine (SYNTHROID) 50 mcg tablet levothyroxine   50 mcg 1 tablet once a day    OMEGA-3 FATTY ACIDS PO [place] 3    LORazepam (ATIVAN) 0.5 mg tablet lorazepam 0.5 mg tablet   one orally every night at bedtime    cholecalciferol (VITAMIN D3) 2,000 unit cap capsule Vitamin D3 2,000 unit capsule   Take by oral route.  aspirin 81 mg chewable tablet Take 1 Tab by mouth daily. No current facility-administered medications for this visit.         Evertt Krabbe, MD  [facility] [place]  [address]

## 2020-07-15 RX ORDER — GUAIFENESIN 100 MG/5ML
LIQUID (ML) ORAL
Qty: 90 TAB | Refills: 0 | Status: SHIPPED | OUTPATIENT
Start: 2020-07-15 | End: 2020-10-12

## 2020-10-12 RX ORDER — GUAIFENESIN 100 MG/5ML
LIQUID (ML) ORAL
Qty: 90 TAB | Refills: 0 | Status: SHIPPED | OUTPATIENT
Start: 2020-10-12 | End: 2020-12-29

## 2020-10-19 ENCOUNTER — OFFICE VISIT (OUTPATIENT)
Dept: CARDIOLOGY CLINIC | Age: 55
End: 2020-10-19
Payer: COMMERCIAL

## 2020-10-19 VITALS
BODY MASS INDEX: 24.43 KG/M2 | WEIGHT: 152 LBS | SYSTOLIC BLOOD PRESSURE: 120 MMHG | HEIGHT: 66 IN | OXYGEN SATURATION: 97 % | DIASTOLIC BLOOD PRESSURE: 60 MMHG | HEART RATE: 68 BPM | RESPIRATION RATE: 18 BRPM

## 2020-10-19 DIAGNOSIS — E78.00 ELEVATED LDL CHOLESTEROL LEVEL: ICD-10-CM

## 2020-10-19 DIAGNOSIS — E78.01 FAMILIAL HYPERCHOLESTEREMIA: ICD-10-CM

## 2020-10-19 DIAGNOSIS — R73.02 IGT (IMPAIRED GLUCOSE TOLERANCE): ICD-10-CM

## 2020-10-19 DIAGNOSIS — E78.5 DYSLIPIDEMIA: ICD-10-CM

## 2020-10-19 DIAGNOSIS — I25.10 CORONARY ARTERY DISEASE INVOLVING NATIVE CORONARY ARTERY OF NATIVE HEART WITHOUT ANGINA PECTORIS: Primary | ICD-10-CM

## 2020-10-19 PROCEDURE — 93000 ELECTROCARDIOGRAM COMPLETE: CPT | Performed by: INTERNAL MEDICINE

## 2020-10-19 PROCEDURE — 99213 OFFICE O/P EST LOW 20 MIN: CPT | Performed by: INTERNAL MEDICINE

## 2020-10-19 NOTE — PROGRESS NOTES
[facility]: Arlyn Kareem  [phone]    HPI: Alonzo Jackman, a 60y.o. year-old who presents for follow up regarding her CAD. Dr. Luis Eduardo Beckford did labs in the summer and she was told that her cholesterol was ok -labs requested  Walks 2-3 miles/day  Denies any chest pain or palpitations  No dyspnea with exertion  No dizziness or syncope  No LE edema   Her daughter had COVID-19 over the summer  - age 23 but she recovered well   They are eating very healthy and trying to maintain a heathy lifestyle  She sees Dr. Luis Eduardo Beckford regularly for weight loss. She will continue her healthy lifestyle and let me know if anything changes from there. Reviewed the implications for CAD on ca score at such a young age, need for RF modification. **After her last office visit labs received dated 6/25/20  Vit D 63, TSH 2.1, A1C 5.4%  , TG 80, LDL 99,   CBC and CMP ok     Assessment/Plan:  1. Vit D deficiency- on 2000 units daily  2. Familial hypercholesterolemia - Hx of   HDL 82  -last lipids 10/19 - , TG 98, , HDL 84, now on rosuvastatin 40mg daily, will request lipid results from Dr. Luis Eduardo Beckford' office   3. CAD- coronary calcium scan - 148 in 2/19, continue ASA and statin  -no ischemia on stress echo in 5/19  -encouraged diet and exercise  -follow up here again in 1 year   4. Anxiety- improved on zoloft, takes ativan PRN, lifestyle interventions, exercise, yoga, etc.    5. IGT - A1C 5.6%, on metformin  6. Hypothyroid- on synthroid  7. 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  She also has no past medical history of Essential hypertension. Cardiovascular ROS: no palpitations or chest pain  Respiratory ROS: no cough, shortness of breath, or wheezing  Neurological ROS: no TIA or stroke symptoms  All other systems negative except as above. PE  Vitals:    10/19/20 1406   BP: 120/60   Pulse: 68   Resp: 18   SpO2: 97%   Weight: 152 lb (68.9 kg)   Height: 5' 6\" (1.676 m)    Body mass index is 24.53 kg/m².   General appearance - alert, well appearing, and in no distress  Mental status - affect appropriate to mood  Eyes - sclera anicteric, moist mucous membranes  Neck - supple, no carotid bruits   Lymphatics - not assessed   Chest - clear to auscultation, no wheezes, rales or rhonchi  Heart - normal rate, regular rhythm, normal S1, S2, no murmurs, rubs, clicks or gallops  Abdomen - soft, nontender, nondistended  Back exam - full range of motion  Neurological - cranial nerves II through XII grossly intact, no focal deficit  Musculoskeletal - normal strength  Extremities - peripheral pulses normal, no pedal edema  Skin - normal coloration  no rashes    Recent Labs:  No results found for: CHOL, CHOLX, CHLST, CHOLV, 296910, HDL, HDLP, LDL, LDLC, DLDLP, TGLX, TRIGL, TRIGP, CHHD, CHHDX  No results found for: CARLO, CREAPOC, ACREA, CREA, REFC3, REFC4  No results found for: BUN, BUNPOC, IBUN, MBUNV, BUNV  No results found for: K, KI, PLK, WBK  No results found for: HBA1C, HGBE8, QXH3ZLDY, IAR6VQHI  No results found for: HGBPOC, HGB, HGBP, HGBEXT, HGBEXT  No results found for: PLT, PLTEXT, PLTEXT    Reviewed:  Past Medical History:   Diagnosis Date    CAD (coronary artery disease)     Hyperlipidemia     Thyroid disease      Social History     Tobacco Use   Smoking Status Never Smoker   Smokeless Tobacco Never Used     Social History     Substance and Sexual Activity   Alcohol Use Yes    Frequency: 2-4 times a month    Drinks per session: 1 or 2     Allergies   Allergen Reactions    Shellfish Derived Itching Current Outpatient Medications   Medication Sig    aspirin 81 mg chewable tablet CHEW AND SWALLOW 1 TABLET BY MOUTH ONCE DAILY    rosuvastatin (CRESTOR) 40 mg tablet Take 1 Tab by mouth daily.  metFORMIN (GLUCOPHAGE) 500 mg tablet metformin   taking 2 per day    sertraline (ZOLOFT) 100 mg tablet sertraline 100 mg tablet   TAKE 1 TABLET BY MOUTH ONCE DAILY    ferrous sulfate (SLOW FE PO) Slow Fe    ascorbate calcium (VITAMIN C PO) Vitamin C    ibuprofen (ADVIL PO) Advil    levothyroxine (SYNTHROID) 50 mcg tablet levothyroxine   50 mcg 1 tablet once a day    OMEGA-3 FATTY ACIDS PO [place] 3    LORazepam (ATIVAN) 0.5 mg tablet lorazepam 0.5 mg tablet   one orally every night at bedtime    cholecalciferol (VITAMIN D3) 2,000 unit cap capsule Vitamin D3 2,000 unit capsule   Take by oral route. No current facility-administered medications for this visit.         Antonio Saenz MD  [facility] heart and Vascular [place]  [address]

## 2020-10-21 ENCOUNTER — TELEPHONE (OUTPATIENT)
Dept: CARDIOLOGY CLINIC | Age: 55
End: 2020-10-21

## 2020-10-21 DIAGNOSIS — E78.5 DYSLIPIDEMIA: Primary | ICD-10-CM

## 2020-10-21 DIAGNOSIS — E78.01 FAMILIAL HYPERCHOLESTEREMIA: ICD-10-CM

## 2020-10-21 RX ORDER — EZETIMIBE 10 MG/1
10 TABLET ORAL DAILY
Qty: 90 TAB | Refills: 2 | Status: SHIPPED | OUTPATIENT
Start: 2020-10-21 | End: 2021-07-12

## 2020-10-21 RX ORDER — EZETIMIBE 10 MG/1
TABLET ORAL
COMMUNITY
End: 2020-10-21 | Stop reason: SDUPTHER

## 2020-10-21 NOTE — TELEPHONE ENCOUNTER
Returned patient's call, 2 pt identifiers used    The following message given. Got labs from Dr. Beth Cheatham, LDL 99 but goal LDL is <70 for her CAD, please ask her to continue rosuvastatin and begin zetia 10mg daily, she will need fasting lipids again in 3 months. Patient did read her my chart message. Lab slip mailed to patient.

## 2020-10-21 NOTE — TELEPHONE ENCOUNTER
Got labs from Dr. Beth Cheatham, LDL 99 but goal LDL is <70 for her CAD, please ask her to continue rosuvastatin and begin zetia 10mg daily, she will need fasting lipids again in 3 months

## 2020-12-29 RX ORDER — GUAIFENESIN 100 MG/5ML
LIQUID (ML) ORAL
Qty: 90 TAB | Refills: 1 | Status: SHIPPED | OUTPATIENT
Start: 2020-12-29

## 2020-12-29 NOTE — TELEPHONE ENCOUNTER
Request for Aspirin 81 mg daily. Last office visit 10/19/20, next office visit 10/19/21. Refills per verbal order from Dr. Zaragoza Center as Dr. Irais Childs is currently out of the office .

## 2021-07-12 RX ORDER — EZETIMIBE 10 MG/1
TABLET ORAL
Qty: 90 TABLET | Refills: 0 | Status: SHIPPED | OUTPATIENT
Start: 2021-07-12

## 2021-07-12 NOTE — TELEPHONE ENCOUNTER
Requested Prescriptions     Signed Prescriptions Disp Refills    ezetimibe (ZETIA) 10 mg tablet 90 Tablet 0     Sig: Take 1 tablet by mouth once daily     Authorizing Provider: Car Rodriguez     Ordering User: Marco Antonio Payment     Per verbal orders

## 2021-07-16 ENCOUNTER — TELEPHONE (OUTPATIENT)
Dept: CARDIOLOGY CLINIC | Age: 56
End: 2021-07-16

## 2021-07-16 NOTE — TELEPHONE ENCOUNTER
I called [facility].help desk and spoke with Susan BARRETT for Ezetimibe 10 mg. It was approved, start today's date and will end on 07/16/2022,ref. # [id number]. I called [address] and spoke with Carmen (Pharmacist) re:approval of Zetia.

## 2021-07-16 NOTE — TELEPHONE ENCOUNTER
Fax received from [employer] prior auth needed for ezetimibe 10 mg tab     [facility]/priorauthportal and enter TRX code [id number]     Thank you,     Nichole Davis

## 2021-09-27 RX ORDER — ROSUVASTATIN CALCIUM 40 MG/1
40 TABLET, COATED ORAL DAILY
Qty: 90 TABLET | Refills: 0 | Status: SHIPPED | OUTPATIENT
Start: 2021-09-27

## 2021-09-27 NOTE — TELEPHONE ENCOUNTER
Requested Prescriptions     Signed Prescriptions Disp Refills    rosuvastatin (CRESTOR) 40 mg tablet 90 Tablet 0     Sig: Take 1 Tablet by mouth daily.  Take 1 tab nightly     Authorizing Provider: Maureen Haines     Ordering User: Janeth Doll     Per verbal orders

## 2023-05-12 RX ORDER — EZETIMIBE 10 MG/1
1 TABLET ORAL DAILY
COMMUNITY
Start: 2021-07-12

## 2023-05-12 RX ORDER — ROSUVASTATIN CALCIUM 40 MG/1
TABLET, COATED ORAL DAILY
COMMUNITY
Start: 2021-09-27

## 2023-05-12 RX ORDER — ASPIRIN 81 MG/1
1 TABLET, CHEWABLE ORAL DAILY
COMMUNITY
Start: 2020-12-29

## 2023-05-12 RX ORDER — LEVOTHYROXINE SODIUM 0.05 MG/1
TABLET ORAL
COMMUNITY

## 2023-05-12 RX ORDER — SERTRALINE HYDROCHLORIDE 100 MG/1
TABLET, FILM COATED ORAL
COMMUNITY
Start: 2018-03-20

## 2023-05-12 RX ORDER — LORAZEPAM 0.5 MG/1
TABLET ORAL
COMMUNITY

## 2023-10-04 ENCOUNTER — OFFICE VISIT (OUTPATIENT)
Age: 58
End: 2023-10-04
Payer: COMMERCIAL

## 2023-10-04 DIAGNOSIS — N63.21 MASS OF UPPER OUTER QUADRANT OF LEFT BREAST: Primary | ICD-10-CM

## 2023-10-04 PROCEDURE — 99203 OFFICE O/P NEW LOW 30 MIN: CPT | Performed by: SURGERY

## 2023-10-04 PROCEDURE — 76642 ULTRASOUND BREAST LIMITED: CPT | Performed by: SURGERY

## 2023-10-04 NOTE — PROGRESS NOTES
HISTORY OF PRESENT ILLNESS  Katie Natarajan is a 59 y.o. female     HPI NEW patient consult referred by Dr. Starla Dodson for LEFT breast mass. She thinks it may be a stitch. Also here for US to evaluate implant integrity. Denies any breast changes. History of BILATERAL mastectomies by Dr. Sirena Ventura 10/2010 for RIGHT breast ILC with reconstruction. Took tamoxifen for 5 years (Dr. Guy Todd). Replaced implants in 2017. Genetic testing negative. Family History: Mother, breast cancer, dx late 46s  Father, prostate cancer, dx 62  Maternal grandmother, hepatic cancer, dx 61  Maternal grandfather, prostate cancer, dx 80  Paternal grandmother, uterine or ovarian cancer, dx 80s  Paternal grandfather, throat cancer  Paternal 1st cousin, colon cancer, dx late 45s  Paternal aunt, breast cancer, dx 62s      No recent imaging.         Review of Systems      Physical Exam       ASSESSMENT and PLAN  {Assessment and Plan Chronic Disease:[id number]}

## 2023-10-04 NOTE — PROGRESS NOTES
HISTORY OF PRESENT ILLNESS  Gena Emmanuel is a 59 y.o. female. Breast Problem    NEW patient consult referred by Dr. Karen Mejia for LEFT breast mass. She thinks it may be a stitch. Also here for US to evaluate implant integrity. Denies any breast changes. History of BILATERAL mastectomies by Dr. Porsche White 10/2010 for RIGHT breast ILC with reconstruction. Took tamoxifen for 5 years (Dr. Ruslan Gomez). Replaced implants in 2017. Genetic testing negative. Family History: Mother, breast cancer, dx late 46s  Father, prostate cancer, dx 62  Maternal grandmother, hepatic cancer, dx 61  Maternal grandfather, prostate cancer, dx 80  Paternal grandmother, uterine or ovarian cancer, dx 80s  Paternal grandfather, throat cancer  Paternal 1st cousin, colon cancer, dx late 45s  Paternal aunt, breast cancer, dx 62s        No recent imaging. 10/26/2010  - [place] lymph node, biopsy:        2 benign lymph nodes (3 levels and cytokeratin immunostain negative)   - LEFT breast, prophylactic mastectomy:        Benign breast with fibrocystic changes   - RIGHT breast, simple mastectomy:        Infiltrating well differentiated invasive ductal carcinoma with lobular features. Size: 1.7 cm, overall grade 4, negative margins, ER + (100%)/ID+ (100%)/HER2-, T1C N0. Past Medical History:   Diagnosis Date    CAD (coronary artery disease)     Hyperlipidemia     Thyroid disease        No past surgical history on file.     Social History     Socioeconomic History    Marital status:      Spouse name: Not on file    Number of children: Not on file    Years of education: Not on file    Highest education level: Not on file   Occupational History    Not on file   Tobacco Use    Smoking status: Never    Smokeless tobacco: Never   Substance and Sexual Activity    Alcohol use: Yes    Drug use: Never    Sexual activity: Not on file   Other Topics Concern    Not on file   Social History Narrative    Not on file     Social